# Patient Record
Sex: FEMALE | Race: WHITE
[De-identification: names, ages, dates, MRNs, and addresses within clinical notes are randomized per-mention and may not be internally consistent; named-entity substitution may affect disease eponyms.]

---

## 2021-07-07 ENCOUNTER — HOSPITAL ENCOUNTER (OUTPATIENT)
Dept: HOSPITAL 74 - FASU-ENDO | Age: 61
Discharge: HOME | End: 2021-07-07
Attending: INTERNAL MEDICINE
Payer: COMMERCIAL

## 2021-07-07 VITALS — DIASTOLIC BLOOD PRESSURE: 76 MMHG | HEART RATE: 70 BPM | SYSTOLIC BLOOD PRESSURE: 122 MMHG

## 2021-07-07 VITALS — BODY MASS INDEX: 30.7 KG/M2

## 2021-07-07 VITALS — TEMPERATURE: 96.9 F

## 2021-07-07 DIAGNOSIS — Z98.84: ICD-10-CM

## 2021-07-07 DIAGNOSIS — K20.90: ICD-10-CM

## 2021-07-07 DIAGNOSIS — K29.50: Primary | ICD-10-CM

## 2021-07-07 DIAGNOSIS — R10.13: ICD-10-CM

## 2021-07-07 PROCEDURE — 0DB88ZX EXCISION OF SMALL INTESTINE, VIA NATURAL OR ARTIFICIAL OPENING ENDOSCOPIC, DIAGNOSTIC: ICD-10-PCS | Performed by: INTERNAL MEDICINE

## 2021-07-07 PROCEDURE — 0DB68ZX EXCISION OF STOMACH, VIA NATURAL OR ARTIFICIAL OPENING ENDOSCOPIC, DIAGNOSTIC: ICD-10-PCS | Performed by: INTERNAL MEDICINE

## 2021-07-07 PROCEDURE — 0DB48ZX EXCISION OF ESOPHAGOGASTRIC JUNCTION, VIA NATURAL OR ARTIFICIAL OPENING ENDOSCOPIC, DIAGNOSTIC: ICD-10-PCS | Performed by: INTERNAL MEDICINE

## 2021-12-22 ENCOUNTER — EMERGENCY (EMERGENCY)
Facility: HOSPITAL | Age: 61
LOS: 1 days | Discharge: HOME | End: 2021-12-22
Attending: EMERGENCY MEDICINE | Admitting: EMERGENCY MEDICINE
Payer: COMMERCIAL

## 2021-12-22 VITALS
OXYGEN SATURATION: 97 % | TEMPERATURE: 98 F | DIASTOLIC BLOOD PRESSURE: 57 MMHG | SYSTOLIC BLOOD PRESSURE: 120 MMHG | RESPIRATION RATE: 18 BRPM | HEART RATE: 68 BPM

## 2021-12-22 VITALS
DIASTOLIC BLOOD PRESSURE: 68 MMHG | SYSTOLIC BLOOD PRESSURE: 121 MMHG | HEART RATE: 72 BPM | OXYGEN SATURATION: 96 % | TEMPERATURE: 98 F | RESPIRATION RATE: 18 BRPM

## 2021-12-22 DIAGNOSIS — Z88.0 ALLERGY STATUS TO PENICILLIN: ICD-10-CM

## 2021-12-22 DIAGNOSIS — E03.9 HYPOTHYROIDISM, UNSPECIFIED: ICD-10-CM

## 2021-12-22 DIAGNOSIS — E11.9 TYPE 2 DIABETES MELLITUS WITHOUT COMPLICATIONS: ICD-10-CM

## 2021-12-22 DIAGNOSIS — Z20.822 CONTACT WITH AND (SUSPECTED) EXPOSURE TO COVID-19: ICD-10-CM

## 2021-12-22 DIAGNOSIS — R42 DIZZINESS AND GIDDINESS: ICD-10-CM

## 2021-12-22 DIAGNOSIS — R51.9 HEADACHE, UNSPECIFIED: ICD-10-CM

## 2021-12-22 DIAGNOSIS — I10 ESSENTIAL (PRIMARY) HYPERTENSION: ICD-10-CM

## 2021-12-22 LAB
A1C WITH ESTIMATED AVERAGE GLUCOSE RESULT: 6.3 % — HIGH (ref 4–5.6)
ALBUMIN SERPL ELPH-MCNC: 4.5 G/DL — SIGNIFICANT CHANGE UP (ref 3.3–5)
ALP SERPL-CCNC: 82 U/L — SIGNIFICANT CHANGE UP (ref 40–120)
ALT FLD-CCNC: 15 U/L — SIGNIFICANT CHANGE UP (ref 10–45)
ANION GAP SERPL CALC-SCNC: 12 MMOL/L — SIGNIFICANT CHANGE UP (ref 5–17)
APTT BLD: 33.2 SEC — SIGNIFICANT CHANGE UP (ref 27.5–35.5)
AST SERPL-CCNC: 19 U/L — SIGNIFICANT CHANGE UP (ref 10–40)
BASOPHILS # BLD AUTO: 0.05 K/UL — SIGNIFICANT CHANGE UP (ref 0–0.2)
BASOPHILS NFR BLD AUTO: 0.7 % — SIGNIFICANT CHANGE UP (ref 0–2)
BILIRUB SERPL-MCNC: 0.4 MG/DL — SIGNIFICANT CHANGE UP (ref 0.2–1.2)
BUN SERPL-MCNC: 17 MG/DL — SIGNIFICANT CHANGE UP (ref 7–23)
CALCIUM SERPL-MCNC: 9.4 MG/DL — SIGNIFICANT CHANGE UP (ref 8.4–10.5)
CHLORIDE SERPL-SCNC: 100 MMOL/L — SIGNIFICANT CHANGE UP (ref 96–108)
CO2 SERPL-SCNC: 24 MMOL/L — SIGNIFICANT CHANGE UP (ref 22–31)
CREAT SERPL-MCNC: 0.89 MG/DL — SIGNIFICANT CHANGE UP (ref 0.5–1.3)
EOSINOPHIL # BLD AUTO: 0.21 K/UL — SIGNIFICANT CHANGE UP (ref 0–0.5)
EOSINOPHIL NFR BLD AUTO: 3 % — SIGNIFICANT CHANGE UP (ref 0–6)
ESTIMATED AVERAGE GLUCOSE: 134 MG/DL — HIGH (ref 68–114)
GLUCOSE SERPL-MCNC: 121 MG/DL — HIGH (ref 70–99)
HCT VFR BLD CALC: 37.8 % — SIGNIFICANT CHANGE UP (ref 34.5–45)
HGB BLD-MCNC: 12.7 G/DL — SIGNIFICANT CHANGE UP (ref 11.5–15.5)
IMM GRANULOCYTES NFR BLD AUTO: 0.3 % — SIGNIFICANT CHANGE UP (ref 0–1.5)
INR BLD: 0.94 — SIGNIFICANT CHANGE UP (ref 0.88–1.16)
LYMPHOCYTES # BLD AUTO: 2.1 K/UL — SIGNIFICANT CHANGE UP (ref 1–3.3)
LYMPHOCYTES # BLD AUTO: 30.4 % — SIGNIFICANT CHANGE UP (ref 13–44)
MCHC RBC-ENTMCNC: 31.4 PG — SIGNIFICANT CHANGE UP (ref 27–34)
MCHC RBC-ENTMCNC: 33.6 GM/DL — SIGNIFICANT CHANGE UP (ref 32–36)
MCV RBC AUTO: 93.6 FL — SIGNIFICANT CHANGE UP (ref 80–100)
MONOCYTES # BLD AUTO: 0.68 K/UL — SIGNIFICANT CHANGE UP (ref 0–0.9)
MONOCYTES NFR BLD AUTO: 9.9 % — SIGNIFICANT CHANGE UP (ref 2–14)
NEUTROPHILS # BLD AUTO: 3.84 K/UL — SIGNIFICANT CHANGE UP (ref 1.8–7.4)
NEUTROPHILS NFR BLD AUTO: 55.7 % — SIGNIFICANT CHANGE UP (ref 43–77)
NRBC # BLD: 0 /100 WBCS — SIGNIFICANT CHANGE UP (ref 0–0)
PLATELET # BLD AUTO: 368 K/UL — SIGNIFICANT CHANGE UP (ref 150–400)
POTASSIUM SERPL-MCNC: 3.9 MMOL/L — SIGNIFICANT CHANGE UP (ref 3.5–5.3)
POTASSIUM SERPL-SCNC: 3.9 MMOL/L — SIGNIFICANT CHANGE UP (ref 3.5–5.3)
PROT SERPL-MCNC: 7.2 G/DL — SIGNIFICANT CHANGE UP (ref 6–8.3)
PROTHROM AB SERPL-ACNC: 11.3 SEC — SIGNIFICANT CHANGE UP (ref 10.6–13.6)
RBC # BLD: 4.04 M/UL — SIGNIFICANT CHANGE UP (ref 3.8–5.2)
RBC # FLD: 13.2 % — SIGNIFICANT CHANGE UP (ref 10.3–14.5)
SARS-COV-2 RNA SPEC QL NAA+PROBE: NEGATIVE — SIGNIFICANT CHANGE UP
SODIUM SERPL-SCNC: 136 MMOL/L — SIGNIFICANT CHANGE UP (ref 135–145)
TROPONIN T SERPL-MCNC: 0.01 NG/ML — SIGNIFICANT CHANGE UP (ref 0–0.01)
TSH SERPL-MCNC: 3.12 UIU/ML — SIGNIFICANT CHANGE UP (ref 0.27–4.2)
WBC # BLD: 6.9 K/UL — SIGNIFICANT CHANGE UP (ref 3.8–10.5)
WBC # FLD AUTO: 6.9 K/UL — SIGNIFICANT CHANGE UP (ref 3.8–10.5)

## 2021-12-22 PROCEDURE — 36415 COLL VENOUS BLD VENIPUNCTURE: CPT

## 2021-12-22 PROCEDURE — 83036 HEMOGLOBIN GLYCOSYLATED A1C: CPT

## 2021-12-22 PROCEDURE — 70496 CT ANGIOGRAPHY HEAD: CPT | Mod: 26,MA

## 2021-12-22 PROCEDURE — 87635 SARS-COV-2 COVID-19 AMP PRB: CPT

## 2021-12-22 PROCEDURE — 99284 EMERGENCY DEPT VISIT MOD MDM: CPT | Mod: 25

## 2021-12-22 PROCEDURE — 99285 EMERGENCY DEPT VISIT HI MDM: CPT

## 2021-12-22 PROCEDURE — 85730 THROMBOPLASTIN TIME PARTIAL: CPT

## 2021-12-22 PROCEDURE — 85610 PROTHROMBIN TIME: CPT

## 2021-12-22 PROCEDURE — 0042T: CPT

## 2021-12-22 PROCEDURE — 70496 CT ANGIOGRAPHY HEAD: CPT | Mod: MA

## 2021-12-22 PROCEDURE — 80053 COMPREHEN METABOLIC PANEL: CPT

## 2021-12-22 PROCEDURE — 70498 CT ANGIOGRAPHY NECK: CPT | Mod: MA

## 2021-12-22 PROCEDURE — 93005 ELECTROCARDIOGRAM TRACING: CPT

## 2021-12-22 PROCEDURE — 84443 ASSAY THYROID STIM HORMONE: CPT

## 2021-12-22 PROCEDURE — 93010 ELECTROCARDIOGRAM REPORT: CPT

## 2021-12-22 PROCEDURE — 82962 GLUCOSE BLOOD TEST: CPT

## 2021-12-22 PROCEDURE — 96374 THER/PROPH/DIAG INJ IV PUSH: CPT | Mod: XU

## 2021-12-22 PROCEDURE — 70498 CT ANGIOGRAPHY NECK: CPT | Mod: 26,MA

## 2021-12-22 PROCEDURE — 70450 CT HEAD/BRAIN W/O DYE: CPT | Mod: MA

## 2021-12-22 PROCEDURE — 84484 ASSAY OF TROPONIN QUANT: CPT

## 2021-12-22 PROCEDURE — 85025 COMPLETE CBC W/AUTO DIFF WBC: CPT

## 2021-12-22 RX ORDER — METOCLOPRAMIDE HCL 10 MG
10 TABLET ORAL ONCE
Refills: 0 | Status: COMPLETED | OUTPATIENT
Start: 2021-12-22 | End: 2021-12-22

## 2021-12-22 RX ORDER — SODIUM CHLORIDE 9 MG/ML
500 INJECTION INTRAMUSCULAR; INTRAVENOUS; SUBCUTANEOUS ONCE
Refills: 0 | Status: COMPLETED | OUTPATIENT
Start: 2021-12-22 | End: 2021-12-22

## 2021-12-22 RX ORDER — MECLIZINE HCL 12.5 MG
1 TABLET ORAL
Qty: 15 | Refills: 0
Start: 2021-12-22 | End: 2021-12-26

## 2021-12-22 RX ORDER — MECLIZINE HCL 12.5 MG
25 TABLET ORAL ONCE
Refills: 0 | Status: COMPLETED | OUTPATIENT
Start: 2021-12-22 | End: 2021-12-22

## 2021-12-22 RX ADMIN — SODIUM CHLORIDE 500 MILLILITER(S): 9 INJECTION INTRAMUSCULAR; INTRAVENOUS; SUBCUTANEOUS at 16:52

## 2021-12-22 RX ADMIN — Medication 25 MILLIGRAM(S): at 16:52

## 2021-12-22 RX ADMIN — Medication 104 MILLIGRAM(S): at 16:52

## 2021-12-22 NOTE — ED ADULT NURSE NOTE - INTERVENTIONS DEFINITIONS
Room bathroom lighting operational/Physically safe environment: no spills, clutter or unnecessary equipment/Stretcher in lowest position, wheels locked, appropriate side rails in place/Provide visual cue, wrist band, yellow gown, etc./Monitor gait and stability/Monitor for mental status changes and reorient to person, place, and time/Review medications for side effects contributing to fall risk

## 2021-12-22 NOTE — ED PROVIDER NOTE - PROGRESS NOTE DETAILS
CTs negative. Likely peripheral vertigo. D/w Neuro, ok for DC.   Pt feeling improved and is stable for DC. ED evaluation and management discussed with the patient in detail.  Close PMD follow up encouraged.  Strict ED return instructions discussed in detail and patient given the opportunity to ask any questions about their discharge diagnosis and instructions. Patient verbalized understanding.

## 2021-12-22 NOTE — ED PROVIDER NOTE - NSFOLLOWUPINSTRUCTIONS_ED_ALL_ED_FT
Please follow up with your primary care physician. You may call our referrals coordinator at [phone] Monday to Friday 11am-7pm for assistance with making an appointment.  Return to the Emergency Department if you have any new or worsening symptoms, or for any other concerns. Please read below for further information.    Vertigo    WHAT YOU NEED TO KNOW:    What is vertigo? Vertigo is a condition that causes you to feel dizzy. You may feel that you or everything around you is moving or spinning. You may also feel like you are being pulled down or toward your side.     What causes vertigo? The inner ear is filled with fluid, a nerve, and small organs. These structures help you maintain your balance. Vertigo may be caused by diseases or conditions that affect your inner ear or the part of your brain that controls balance. Any of the following can cause vertigo:     Small particles that float in the inner ear fluid move out of place and cause irritation      Ménière disease       Ear trauma      An inner ear infection      A neurologic condition such as multiple sclerosis, migraine, tumor, or stroke      Panic and anxiety disorders       Drinking a large amount of alcohol    What signs and symptoms may happen with vertigo?     Nausea or vomiting      Trouble with your balance      Sensitivity to light or sound      Weakness, slurred speech, problems seeing or moving, or increased sleepiness      Facial weakness and headache      Hearing loss, ear fullness or pain, or hearing ringing sounds      Fast, uncontrolled movement of your eyes    How is vertigo diagnosed? Your healthcare provider will ask about your symptoms. Tell your healthcare provider about past diseases, travels, activities, trauma, and medicines. Your healthcare provider may move your head in different directions. This will check to see if a problem in the inner ear is causing your vertigo. You may be asked to do some exercises that could make you dizzy. You may also need one or more of the following to find the cause of vertigo:    An electronystagmography (ENG) is done to test for problems you may have with balance or dizziness. Sticky pads with wires are placed on the skin around your eyes. The wires are connected to a machine that records information during your ENG. Warm and cool air or water is put into your ears while your eye movements are recorded. Do not drink alcohol or eat a heavy meal before this test. You may feel dizzy or nauseated after the test.      An auditory brainstem response (ABR) test is used to play a series of clicks through headsets on your ears. A machine measures how your cochlea and nerves react to the clicks.      An MRI of the brain may be used to check for problems that can cause vertigo. Do not enter the MRI room with anything metal. Metal can cause serious damage. Tell the healthcare provider if you have any metal in or on your body.    How is vertigo treated? Treatment will depend on the condition causing the vertigo. Your healthcare provider may suggest that you rest in bed or avoid certain activities for a time. You may need to decrease or stop taking medicines that are causing your vertigo. Medicines may also be prescribed to help relieve your symptoms.     How can I manage my symptoms?     Do not drive, walk without help, or operate heavy machinery when you are dizzy.       Move slowly when you move from one position to another position. Get up slowly from sitting or lying down. Sit or lie down right away if you feel dizzy.      Drink plenty of liquids. Liquids help prevent dehydration. Ask how much liquid to drink each day and which liquids are best for you.      Vestibular and balance rehabilitation therapy (VBRT) is used to teach you exercises to improve your balance and strength. These exercises may help decrease your vertigo and improve your balance. Ask for more information about this therapy.    When should I seek immediate care?     You have a headache and a stiff neck.      You have shaking chills and a fever.       You vomit over and over with no relief.       You have blood, pus, or fluid coming out of your ears.      You are confused.     When should I contact my healthcare provider?     Your symptoms do not get better with treatment.       You have questions about your condition or care.    CARE AGREEMENT:    You have the right to help plan your care. Learn about your health condition and how it may be treated. Discuss treatment options with your healthcare providers to decide what care you want to receive. You always have the right to refuse treatment.        © Copyright [employer] 2020

## 2021-12-22 NOTE — ED PROVIDER NOTE - CLINICAL SUMMARY MEDICAL DECISION MAKING FREE TEXT BOX
61F with a hx of HTN, DM2, hypothyroidism who p/w sudden onset dizziness at 2:15pm while she was with her mother who is waiting for a procedure upstairs in the hospital. Pt states sx started after she ate a banana, she felt dizzy, lightheaded and unsteady with associate headache and tingling to bilateral hands and feet. She states sx have persisted for >1 hour so she came to the ER. Stroke code called upon arrival. Pt denies cp/sob, n/v, neck pain, unilateral weakness or other complaints.   Pt is well-appearing on exam, VSS, Pt unsteady and noted to have horiz nystagmus on exam. ddx peripheral vs central vertigo, stroke code called, dispo pending CTs, labs and neuro recs. IVFs, meclizine and Reglan ordered for dizziness and HA.

## 2021-12-22 NOTE — ED PROVIDER NOTE - PATIENT PORTAL LINK FT
You can access the FollowMyHealth Patient Portal offered by [facility] by registering at the following website: [email or web address]. By joining [facility]’s FollowMyHealth portal, you will also be able to view your health information using other applications (apps) compatible with our system.

## 2021-12-22 NOTE — ED PROVIDER NOTE - PHYSICAL EXAMINATION
GEN: Well appearing, well developed, awake, alert, oriented to person, place, time/situation and in no apparent distress. NTAF  ENT: Airway patent, Nasal mucosa clear. Mouth with normal mucosa.  EYES: Clear bilaterally. PERRL, EOMI, +horizontal nystagmus.   RESPIRATORY: Breathing comfortably with normal RR. No W/C/R, no hypoxia or resp distress.  CARDIAC: Regular rate and rhythm, no M/R/G  ABDOMEN: Soft, nontender, +bowel sounds, no rebound, rigidity, or guarding.  MSK: Range of motion is not limited, no deformities noted.  NEURO: Alert and oriented x 3. Cn 2-12 intact. Strength 5/5 and sensation intact in all 4 extremities. no pronator drift. FTN normal.  Gait normal.   SKIN: Skin normal color for race, warm, dry and intact. No evidence of rash.  PSYCH: Alert and oriented to person, place, time/situation. normal mood and affect. no apparent risk to self or others.

## 2021-12-22 NOTE — CONSULT NOTE ADULT - ASSESSMENT
61y Female with PMHx of HTN, HLD, DM who presented to the ED due to acute onset dizziness that occurred while the patient was with her family member in the echo lab for outpatient studies, stroke code called in the ED. CTH with acute indeterminate infarction in the right lentiform nucleus, however, does not correlate with symptoms of dizziness, therefore, an incidental finding. CTP normal. CTA pending. NIHSS 0. based on patient's nonfocal exam and dizziness worsened with movement (not constant) it is more likely that patient's symptoms are peripheral in etiology.     Recommendations:  - treatment as peripheral vertigo  - follow up official CTA read  - if CTA with insignificant findings, no further stroke workup  - dispo as per ED  61y Female with PMHx of HTN, HLD, DM who presented to the ED due to acute onset dizziness that occurred while the patient was with her family member in the echo lab for outpatient studies, stroke code called in the ED. CTH with acute indeterminate infarction in the right lentiform nucleus, however, does not correlate with symptoms of dizziness, therefore, an incidental finding. CTP normal. CTA negative. NIHSS 0. based on patient's nonfocal exam and dizziness worsened with movement (not constant) it is more likely that patient's symptoms are peripheral in etiology.     Recommendations:  - treatment as peripheral vertigo  - no further inpatient stroke workup warranted   - dispo as per ED

## 2021-12-22 NOTE — ED ADULT NURSE NOTE - OBJECTIVE STATEMENT
Pt. w/ c/o dizziness about 15 minutes prior to coming to ED. Stroke Code called. Pt. w/ c/o dizziness since around 2:15 PM. Stroke Code called at 3:50 PM by MD Bursn.

## 2021-12-22 NOTE — ED PROVIDER NOTE - OBJECTIVE STATEMENT
61F with a hx of HTN, DM2, hypothyroidism who p/w sudden onset dizziness at 2:15pm while she was with her mother who is waiting for a procedure upstairs in the hospital. Pt states sx started after she ate a banana, she felt dizzy, lightheaded and unsteady with associate headache and tingling to bilateral hands and feet. She states sx have persisted for >1 hour so she came to the ER. Stroke code called upon arrival. Pt denies cp/sob, n/v, neck pain, unilateral weakness or other complaints.

## 2021-12-22 NOTE — STROKE CODE NOTE - IV ALTEPLASE EXCLUSION ABS OTHER
nondisabling neurological deficits nondisabling neurological deficits, less likely stroke, more like peripheral vertigo

## 2021-12-22 NOTE — CONSULT NOTE ADULT - SUBJECTIVE AND OBJECTIVE BOX
**STROKE CODE CONSULT NOTE**    Last known well time: 1500 on 12/22    HPI: 61y Female with PMHx of HTN, HLD, DM who presented to the ED due to acute onset dizziness that occurred while the patient was with her family member in the echo lab for outpatient studies, stroke code called in the ED. The patient states that she was with her family member at the hospital since early this morning waiting for stress test and echocardiogram. States that a nurse gave her a banana to eat since they have been in the hospital all day and shortly after she ate the banana, she started to feel dizziness as if the room was spinning. States that the dizziness was worse with movement and improved when she would stay still, therefore, dizziness is not constant, intermittent based on position. The patient states that she also felt somewhat lightheaded like she was going to pass out. her FS was 222 in the ED. The patient states that she has had dizzy spells in the past, however, never usually this severe. denies acute onset of numbness, weakness, tingling, slurred speech, blurry vision, double vision. Endorses frontal headache.  When the patient was on the CT scanner, she did not feel dizziness, however, when she sat up from the scanner she began to feel the room spinning again. When she closed her eyes, she no longer felt dizzy.       T(C): --  HR: --  BP: --  RR: --  SpO2: --    PAST MEDICAL & SURGICAL HISTORY:      FAMILY HISTORY:      SOCIAL HISTORY:   Smoking status: denies  Drinking: denies  Drug Use:  denies    ROS:   as per HPI    MEDICATIONS  (STANDING):  meclizine 25 milliGRAM(s) Oral Once  metoclopramide  IVPB 10 milliGRAM(s) IV Intermittent Once  sodium chloride 0.9% Bolus 500 milliLiter(s) IV Bolus once    MEDICATIONS  (PRN):    Allergies    penicillin (Unknown)    Intolerances      Vital Signs Last 24 Hrs  T(C): --  T(F): --  HR: --  BP: --  BP(mean): --  RR: --  SpO2: --    Physical exam:  Constitutional: No acute distress, conversant    Neurologic:  -Mental status: Awake, alert, oriented to person, place, and time. Speech is fluent with intact naming, repetition, and comprehension, no dysarthria. Recent and remote memory intact. Follows commands. Attention/concentration intact. Fund of knowledge appropriate.  -Cranial nerves:   II: Visual fields are full to confrontation.  III, IV, VI: Extraocular movements are intact without nystagmus. Pupils equally round and reactive to light  V:  Facial sensation V1-V3 equal and intact   VII: Face is symmetric with normal eye closure and smile  VIII: Hearing is bilaterally intact to finger rub  IX, X: Uvula is midline and soft palate rises symmetrically  XI: Head turning and shoulder shrug are intact.  XII: Tongue protrudes midline  Motor: Normal bulk and tone. No pronator drift. Strength bilateral upper extremity 5/5, bilateral lower extremities 5/5.  Sensation: Intact to light touch bilaterally. No neglect or extinction on double simultaneous testing.  Coordination: No dysmetria on finger-to-nose and heel-to-shin bilaterally  Reflexes: Downgoing toes bilaterally   Gait: Narrow gait and steady    NIHSS: 0 ASPECT Score: 6    Fingerstick Blood Glucose: CAPILLARY BLOOD GLUCOSE  222 (22 Dec 2021 15:58)      POCT Blood Glucose.: 222 mg/dL (22 Dec 2021 15:45)    LABS:        RADIOLOGY & ADDITIONAL STUDIES:  CT Brain Stroke Protocol (12.22.21 @ 16:14) >    IMPRESSION:    Age-indeterminate lacunar infarct in the right lentiform nucleus. No   acute intracranial hemorrhage.     CT Perfusion w/ Maps w/ IV Cont (12.22.21 @ 16:23) >    IMPRESSION:Normal CT perfusion study.        -----------------------------------------------------------------------------------------------------------------  IV-tPA (Y/N): no                             Reason IV-tPA not given: nondisabling neurological deficits, less likely stroke    **STROKE CODE CONSULT NOTE**    Last known well time: 1500 on 12/22    HPI: 61y Female with PMHx of HTN, HLD, DM who presented to the ED due to acute onset dizziness that occurred while the patient was with her family member in the echo lab for outpatient studies, stroke code called in the ED. The patient states that she was with her family member at the hospital since early this morning waiting for stress test and echocardiogram. States that a nurse gave her a banana to eat since they have been in the hospital all day and shortly after she ate the banana, she started to feel dizziness as if the room was spinning. States that the dizziness was worse with movement and improved when she would stay still, therefore, dizziness is not constant, intermittent based on position. The patient states that she also felt somewhat lightheaded like she was going to pass out. her FS was 222 in the ED. The patient states that she has had dizzy spells in the past, however, never usually this severe. denies acute onset of numbness, weakness, tingling, slurred speech, blurry vision, double vision. Endorses frontal headache.  When the patient was on the CT scanner, she did not feel dizziness, however, when she sat up from the scanner she began to feel the room spinning again. When she closed her eyes, she no longer felt dizzy.       T(C): --  HR: --  BP: --  RR: --  SpO2: --    PAST MEDICAL & SURGICAL HISTORY:      FAMILY HISTORY:      SOCIAL HISTORY:   Smoking status: denies  Drinking: denies  Drug Use:  denies    ROS:   as per HPI    MEDICATIONS  (STANDING):  meclizine 25 milliGRAM(s) Oral Once  metoclopramide  IVPB 10 milliGRAM(s) IV Intermittent Once  sodium chloride 0.9% Bolus 500 milliLiter(s) IV Bolus once    MEDICATIONS  (PRN):    Allergies    penicillin (Unknown)    Intolerances      Vital Signs Last 24 Hrs  T(C): --  T(F): --  HR: --  BP: --  BP(mean): --  RR: --  SpO2: --    Physical exam:  Constitutional: No acute distress, conversant    Neurologic:  -Mental status: Awake, alert, oriented to person, place, and time. Speech is fluent with intact naming, repetition, and comprehension, no dysarthria. Recent and remote memory intact. Follows commands. Attention/concentration intact. Fund of knowledge appropriate.  -Cranial nerves:   II: Visual fields are full to confrontation.  III, IV, VI: Extraocular movements are intact without nystagmus. Pupils equally round and reactive to light  V:  Facial sensation V1-V3 equal and intact   VII: Face is symmetric with normal eye closure and smile  VIII: Hearing is bilaterally intact to finger rub  IX, X: Uvula is midline and soft palate rises symmetrically  XI: Head turning and shoulder shrug are intact.  XII: Tongue protrudes midline  Motor: Normal bulk and tone. No pronator drift. Strength bilateral upper extremity 5/5, bilateral lower extremities 5/5.  Sensation: Intact to light touch bilaterally. No neglect or extinction on double simultaneous testing.  Coordination: No dysmetria on finger-to-nose and heel-to-shin bilaterally  Reflexes: Downgoing toes bilaterally   Gait: Narrow gait and steady    NIHSS: 0 ASPECT Score: 6    Fingerstick Blood Glucose: CAPILLARY BLOOD GLUCOSE  222 (22 Dec 2021 15:58)      POCT Blood Glucose.: 222 mg/dL (22 Dec 2021 15:45)    LABS:        RADIOLOGY & ADDITIONAL STUDIES:  CT Brain Stroke Protocol (12.22.21 @ 16:14) >    IMPRESSION:    Age-indeterminate lacunar infarct in the right lentiform nucleus. No   acute intracranial hemorrhage.     CT Perfusion w/ Maps w/ IV Cont (12.22.21 @ 16:23) >    IMPRESSION:Normal CT perfusion study.     CT Angio Neck w/ IV Cont (12.22.21 @ 16:24) >    IMPRESSION:    Intracranial CTA: No large vessel occlusion or high-grade stenosis.    Extracranial CTA: No significant stenosis, occlusion or dissection of the   cervical carotid or vertebral arteries.    -----------------------------------------------------------------------------------------------------------------  IV-tPA (Y/N): no                             Reason IV-tPA not given: nondisabling neurological deficits, less likely stroke

## 2022-08-22 NOTE — STROKE CODE NOTE - NIH STROKE SCALE: 7. LIMB ATAXIA, QM
----- Message from Alaina Gupta CNM sent at 8/19/2022  9:48 PM CDT -----  Here are the normal results of your 3 hour glucose tolerance test.      Pt called.  She was already aware of her normal 3 hour GTT.    (0) Absent

## 2023-06-21 ENCOUNTER — HOSPITAL ENCOUNTER (OUTPATIENT)
Dept: HOSPITAL 74 - FASU-ENDO | Age: 63
Discharge: HOME | End: 2023-06-21
Attending: INTERNAL MEDICINE
Payer: COMMERCIAL

## 2023-06-21 VITALS — DIASTOLIC BLOOD PRESSURE: 65 MMHG | HEART RATE: 66 BPM | SYSTOLIC BLOOD PRESSURE: 105 MMHG

## 2023-06-21 VITALS — TEMPERATURE: 96.7 F | RESPIRATION RATE: 18 BRPM

## 2023-06-21 VITALS — BODY MASS INDEX: 25.4 KG/M2

## 2023-06-21 DIAGNOSIS — Z12.11: Primary | ICD-10-CM

## 2023-06-21 DIAGNOSIS — Z80.0: ICD-10-CM

## 2023-06-21 DIAGNOSIS — D12.5: ICD-10-CM

## 2023-06-21 DIAGNOSIS — D12.2: ICD-10-CM

## 2023-06-21 DIAGNOSIS — Z98.0: ICD-10-CM

## 2023-06-21 DIAGNOSIS — Z98.84: ICD-10-CM

## 2023-06-21 PROCEDURE — 0DB68ZX EXCISION OF STOMACH, VIA NATURAL OR ARTIFICIAL OPENING ENDOSCOPIC, DIAGNOSTIC: ICD-10-PCS | Performed by: INTERNAL MEDICINE

## 2023-06-21 PROCEDURE — 0DBL8ZX EXCISION OF TRANSVERSE COLON, VIA NATURAL OR ARTIFICIAL OPENING ENDOSCOPIC, DIAGNOSTIC: ICD-10-PCS | Performed by: INTERNAL MEDICINE

## 2023-06-21 PROCEDURE — 0DBK8ZX EXCISION OF ASCENDING COLON, VIA NATURAL OR ARTIFICIAL OPENING ENDOSCOPIC, DIAGNOSTIC: ICD-10-PCS | Performed by: INTERNAL MEDICINE
